# Patient Record
Sex: MALE | Race: AMERICAN INDIAN OR ALASKA NATIVE
[De-identification: names, ages, dates, MRNs, and addresses within clinical notes are randomized per-mention and may not be internally consistent; named-entity substitution may affect disease eponyms.]

---

## 2019-12-16 ENCOUNTER — HOSPITAL ENCOUNTER (OUTPATIENT)
Dept: HOSPITAL 50 - VM.ED | Age: 39
Setting detail: OBSERVATION
LOS: 1 days | Discharge: HOME | End: 2019-12-17
Attending: ADVANCED PRACTICE MIDWIFE | Admitting: ADVANCED PRACTICE MIDWIFE
Payer: MEDICAID

## 2019-12-16 DIAGNOSIS — R07.89: Primary | ICD-10-CM

## 2019-12-16 DIAGNOSIS — E11.9: ICD-10-CM

## 2019-12-16 DIAGNOSIS — Z79.899: ICD-10-CM

## 2019-12-16 DIAGNOSIS — Z79.4: ICD-10-CM

## 2019-12-16 DIAGNOSIS — I10: ICD-10-CM

## 2019-12-16 LAB
ANION GAP SERPL CALC-SCNC: 15.7 MMOL/L (ref 10–20)
CHLORIDE SERPL-SCNC: 101 MMOL/L (ref 98–107)
SODIUM SERPL-SCNC: 140 MMOL/L (ref 136–145)

## 2019-12-16 PROCEDURE — G0008 ADMIN INFLUENZA VIRUS VAC: HCPCS

## 2019-12-16 PROCEDURE — G0378 HOSPITAL OBSERVATION PER HR: HCPCS

## 2019-12-16 RX ADMIN — Medication SCH UNITS: at 18:23

## 2019-12-16 NOTE — EDM.PDOC
ED HPI GENERAL MEDICAL PROBLEM





- General


Chief Complaint: Chest Pain


Time Seen by Provider: 19 13:30


Source of Information: Reports: Patient





- History of Present Illness


INITIAL COMMENTS - FREE TEXT/NARRATIVE: 





Elizabeth is a 40 y/o male who presents to the ER today with complaints of left 

sided anterior chest pain that hit him about 20 minutes ago while he was 

working at his job as a cook. The pain is sharp and goes deep into his chest 

and radiates down his left arm. He does report getting diaphoretic with the 

pain. He has been having similar episodes for the last 2 months and reports 

that the pain does away when he sits down to rest and relax. Today it seemed 

worse than usual. 


He is an insulin dependent Type 2 diabetic and he sees Dr Arnulfo Serna at 

the [facility] here in [place] for Primary Care. He does report that 

his father had an MI and passed away as a result. Several other male relatives 

have also had cardiac issues. He has had some cardiac issues, but it is unclear 

if he had an MI or if the episode was related to a spider bite, but it sounds 

very much like he did have a cardiac cath. Will attempt to locate records.


  ** Left Chest


Pain Score (Numeric/FACES): 7





- Related Data


 Allergies











Allergy/AdvReac Type Severity Reaction Status Date / Time


 


No Known Drug Allergies Allergy  Other Verified 02/27/15 10:10











Home Meds: 


 Home Meds





Insulin Glarg,Human.Rec.Analog [LantUS] 30 unit SUBCUT DAILY 14 [History]


Levothyroxine 150 mcg PO DAILY 14 [History]


Liraglutide [Victoza 3-Vini] 18 mg .XX DAILY 02/27/15 [History]











Review of Systems





- Review of Systems


Review Of Systems: See Below


Constitutional: Reports: Diaphoresis


Eyes: Reports: No Symptoms


Ears: Reports: No Symptoms


Nose: Reports: No Symptoms


Mouth/Throat: Reports: No Symptoms


Respiratory: Reports: Shortness of Breath


Cardiovascular: Reports: Chest Pain


GI/Abdominal: Reports: No Symptoms


Genitourinary: Reports: No Symptoms


Musculoskeletal: Reports: No Symptoms


Skin: Reports: Diaphoresis


Neurological: Reports: No Symptoms


Psychiatric: Reports: No Symptoms





ED EXAM, GENERAL





- Physical Exam


Exam: See Below


Exam Limited By: No Limitations


General Appearance: Alert, WD/WN, No Apparent Distress, Obese (Adult Male, Good 

Historian)


Ears: Hearing Grossly Normal


Nose: Normal Inspection, No Blood


Throat/Mouth: Normal Lips, Normal Teeth, Normal Voice, No Airway Compromise


Head: Atraumatic, Normocephalic


Neck: Normal Inspection, Supple


Respiratory/Chest: No Respiratory Distress, Lungs Clear, Normal Breath Sounds, 

Chest Non-Tender


Cardiovascular: Normal Peripheral Pulses, Regular Rate, Rhythm, No Edema, No 

Murmur, Other (Heart sounds are quite distant due to large chested stature, 

mild tenderness noted with palpation in the left upper chest region)


GI/Abdominal: Normal Bowel Sounds, Soft, Non-Tender, No Distention


 (Male) Exam: Deferred


Rectal (Males) Exam: Deferred


Back Exam: Normal Inspection, Full Range of Motion


Extremities: Normal Inspection, Normal Range of Motion, Non-Tender, No Pedal 

Edema, Normal Capillary Refill


Neurological: Alert, Oriented, CN II-XII Intact, Normal Cognition, Normal Gait, 

No Motor/Sensory Deficits


Psychiatric: Normal Affect, Normal Mood


Skin Exam: Warm, Dry, Intact, Normal Color, No Rash


Lymphatic: No Adenopathy





EKG INTERPRETATION


EKG Date: 19


Time: 13:15


Rhythm: Other (SR with RBBB)


Rate (Beats/Min): 78


Axis: Normal


P-Wave: Present


QRS: RBBB


ST-T: Normal


QT: Normal


Comparison: No Change (Comparison dated 3/9/2013 from [place])





Course





- Vital Signs


Text/Narrative:: 





1330 The patient was seen by the CNP. Labs, EKG, and CXR were ordered. He was 

given ASA and Nitro 0.4mg SL x 1. 





1405 Patient reported to [facility] that he was pain free and he was sitting calmly at 

side of bed. Labs pending. No ischemic changes noted in Tele tracing.





1500 Lab reviewed. Discussed with patient. Will admit to Observation for 

Telemetry and serial labs. Will plan to recheck Troponin at 6 and 12 hours. 

Will monitor patient on telemetry tonight. Patient has been under a great deal 

of stress as a single dad with his 4 children, however he does have some 

pertinent hx and he is a Type 2 Diabetic and Cardiac Disease cannot be 

excluded. He does have a chronic care appointment scheduled with Dr Santosh Serna tomorrow afternoon, but we will see how he does on Observation. 

Patient in Agreement with the plan. 





Please use the ER Note as the Observation H&P.





Last Recorded V/S: 


 Last Vital Signs











Temp  37.3 C   19 13:17


 


Pulse  79   19 13:17


 


Resp  18   19 13:17


 


BP  157/94 H  19 13:41


 


Pulse Ox  97   19 13:17














- Orders/Labs/Meds


Orders: 


 Active Orders 24 hr











 Category Date Time Status


 


 Admission Status [Patient Status] [ADT] Routine ADT  19 15:10 Ordered


 


 Cardiac Monitoring [RC] .AS DIRECTED Care  19 13:25 Active


 


 EKG Documentation Completion [RC] STAT Care  19 13:25 Active


 


 Orthostatic Vital Signs [RC] ASDIRECTED Care  19 13:25 Active


 


 Oxygen Therapy [RC] PRN Care  19 13:25 Active


 


 Pulse Oximetry [RC] CONTINUOUS Care  19 13:25 Active


 


 Nitroglycerin [Nitrostat] Med  19 13:25 Active





 0.4 mg SL Q5M PRN   


 


 Sodium Chloride 0.9% [Saline Flush] Med  19 13:25 Active





 10 ml FLUSH ASDIRECTED PRN   


 


 Assertion [AST] Urgent Oth  19 13:25 Ordered


 


 Peripheral IV Insertion Adult [OM.PC] Stat Oth  19 13:25 Ordered


 


 Saline Lock Insert [OM.PC] Stat Oth  19 13:25 Ordered


 


 Resuscitation Status Routine Resus Stat  19 14:35 Ordered








 Medication Orders





Nitroglycerin (Nitrostat)  0.4 mg SL Q5M PRN


   PRN Reason: Chest Pain


   Stop: 19 14:38


   Last Admin: 19 13:41  Dose: 0.4 mg


Sodium Chloride (Saline Flush)  10 ml FLUSH ASDIRECTED PRN


   PRN Reason: Keep Vein Open








Labs: 


 Laboratory Tests











  19 Range/Units





  12:33 12:33 12:33 


 


WBC  8.2    (4.0-10.0)  x10^3/uL


 


RBC  5.21    (4.5-6.0)  x10^6/uL


 


Hgb  15.1    (14.0-18.0)  g/dL


 


Hct  43.9    (40.0-52.0)  %


 


MCV  84.3    (78.0-93.0)  fL


 


MCH  29.0    (26.0-32.0)  pg


 


MCHC  34.4    (32.0-36.0)  g/dL


 


RDW Coeff of Nacho  12.7    (10.0-15.0)  %


 


Plt Count  154    (130-400)  x10^3/uL


 


Neut % (Auto)  58.5    (50.0-80.0)  %


 


Lymph % (Auto)  34.6    (25.0-50.0)  %


 


Mono % (Auto)  5.5    (2.0-11.0)  %


 


Eos % (Auto)  1.0    (0.0-4.0)  %


 


Baso % (Auto)  0.4    (0.2-1.2)  %


 


PT   9.9 L   (10.0-12.8)  SEC


 


INR   0.9 L   (2.0-3.5)  


 


APTT   21.9 L   (24.0-36.0)  SEC


 


Sodium    140  (136-145)  mmol/L


 


Potassium    3.7  (3.5-5.1)  mmol/L


 


Chloride    101  ()  mmol/L


 


Carbon Dioxide    27  (21-32)  mmol/L


 


Anion Gap    15.7  (10-20)  mmol/L


 


BUN    12  (7-18)  mg/dL


 


Creatinine    0.7  (0.70-1.30)  mg/dL


 


Est Cr Clr Drug Dosing    TNP  


 


Estimated GFR (MDRD)    > 60  


 


Glucose    150 H  ()  mg/dL


 


Calcium    9.4  (8.5-10.1)  mg/dL


 


Corrected Calcium    9.72  (8.5-10.1)  mg/dL


 


Magnesium    1.9  (1.8-2.4)  mg/dL


 


Total Bilirubin    0.9  (0.2-1.0)  mg/dL


 


AST    31  (15-37)  U/L


 


ALT    63  (16-63)  U/L


 


Alkaline Phosphatase    87  ()  U/L


 


Troponin I    0.00  (0.00-0.08)  ng/mL


 


Total Protein    7.1  (6.4-8.2)  g/dL


 


Albumin    3.6  (3.4-5.0)  g/dL


 


Globulin    3.5  


 


Albumin/Globulin Ratio    1.03  











Meds: 


Medications











Generic Name Dose Route Start Last Admin





  Trade Name Freq  PRN Reason Stop Dose Admin


 


Nitroglycerin  0.4 mg  19 13:25  19 13:41





  Nitrostat  SL  19 14:38  0.4 mg





  Q5M PRN   Administration





  Chest Pain   





     





     





     


 


Sodium Chloride  10 ml  19 13:25  





  Saline Flush  FLUSH   





  ASDIRECTED PRN   





  Keep Vein Open   





     





     





     














Discontinued Medications














Generic Name Dose Route Start Last Admin





  Trade Name Freq  PRN Reason Stop Dose Admin


 


Aspirin  324 mg  19 13:25  19 13:40





  Aspirin  PO  19 13:26  324 mg





  ONETIME ONE   Administration





     





     





     





     














Departure





- Departure


Time of Disposition: 03:15


Disposition:  20


Condition: Good


Clinical Impression: 


 Chest pain, Type 2 diabetes mellitus








- Discharge Information


*PRESCRIPTION DRUG MONITORING PROGRAM REVIEWED*: Not Applicable


*COPY OF PRESCRIPTION DRUG MONITORING REPORT IN PATIENT JIMMY: Not Applicable


Referrals: 


Asa Sutherland MD [Primary Care Provider] - 


Forms:  ED Department Discharge





Sepsis Event Note





- Focused Exam


Vital Signs: 


 Vital Signs











  Temp Pulse Resp BP BP Pulse Ox


 


 19 13:41     157/94 H  


 


 19 13:17  37.3 C  79  18   157/94 H  97











Date Exam was Performed: 19


Time Exam was Performed: 15:22





- Problem List & Annotations


(1) Chest pain


SNOMED Code(s): 34572687


   Code(s): R07.9 - CHEST PAIN, UNSPECIFIED   Status: Acute   Current Visit: No

   Onset Date: ~19   Annotation/Comment:: -Will admit to Observation and 

place on Telemetry and plan serial enzymes at 6 and 12 hours (, 0200)


-Consult Cardiology as needed


   





(2) Type 2 diabetes mellitus


SNOMED Code(s): 33933343


   Code(s): E11.9 - TYPE 2 DIABETES MELLITUS WITHOUT COMPLICATIONS   Status: 

Acute   Current Visit: Yes   Annotation/Comment:: -Continue home insluin doses 

and BGM   


Qualifiers: 


   Diabetes mellitus long term insulin use: with long term use 





- My Orders


Last 24 Hours: 


My Active Orders





19 13:25


Cardiac Monitoring [RC] .AS DIRECTED 


EKG Documentation Completion [RC] STAT 


Orthostatic Vital Signs [RC] ASDIRECTED 


Oxygen Therapy [RC] PRN 


Pulse Oximetry [RC] CONTINUOUS 


Nitroglycerin [Nitrostat]   0.4 mg SL Q5M PRN 


Sodium Chloride 0.9% [Saline Flush]   10 ml FLUSH ASDIRECTED PRN 


Assertion [AST] Urgent 


Peripheral IV Insertion Adult [OM.PC] Stat 


Saline Lock Insert [OM.PC] Stat 





19 14:35


Resuscitation Status Routine 





19 15:10


Admission Status [Patient Status] [ADT] Routine 














- Assessment/Plan


Last 24 Hours: 


My Active Orders





19 13:25


Cardiac Monitoring [RC] .AS DIRECTED 


EKG Documentation Completion [RC] STAT 


Orthostatic Vital Signs [RC] ASDIRECTED 


Oxygen Therapy [RC] PRN 


Pulse Oximetry [RC] CONTINUOUS 


Nitroglycerin [Nitrostat]   0.4 mg SL Q5M PRN 


Sodium Chloride 0.9% [Saline Flush]   10 ml FLUSH ASDIRECTED PRN 


Assertion [AST] Urgent 


Peripheral IV Insertion Adult [OM.PC] Stat 


Saline Lock Insert [OM.PC] Stat 





19 14:35


Resuscitation Status Routine 





19 15:10


Admission Status [Patient Status] [ADT] Routine

## 2019-12-17 RX ADMIN — Medication SCH UNITS: at 08:11

## 2019-12-17 NOTE — PCM.DCSUM1
**Discharge Summary





- Hospital Course


HPI Initial Comments: 





Elizabeth is a 40 y/o male who presented to the ER with complaints of chest pain 

and SOB while working. His troponin and EKG in ER were WNL. His pain had 

improved after arrival to the ER.


Brief History: The patient was observed on telemetry and serial troponin levels 

at 6 and 12 hours were done and both negative. He did continue to have elevated 

blood pressures in the AM. He was given a dose of Toprol XL 25mg po for the 

blood pressure.


Diagnosis: Stroke: No


Modified [place] Scale: No Symptoms at All


Modified [place] Scale Score: 0





- Discharge Data


Discharge Date: 12/17/19


Discharge Disposition: Home, Self-Care 01


Condition: Good





- Referral to Home Health


Primary Care Physician: 


Asa Sutherland MD








- Discharge Diagnosis/Problem(s)


(1) Chest pain


SNOMED Code(s): 75698804


   ICD Code: R07.9 - CHEST PAIN, UNSPECIFIED   Status: Acute   Onset Date: ~12/ 16/19   





(2) Type 2 diabetes mellitus


SNOMED Code(s): 15365086


   ICD Code: E11.9 - TYPE 2 DIABETES MELLITUS WITHOUT COMPLICATIONS   Status: 

Acute   Problem Details: -Continue home insluin doses and BGM   


Qualifiers: 


   Diabetes mellitus long term insulin use: with long term use 





(3) Hypertension


SNOMED Code(s): 46306487


   ICD Code: I10 - ESSENTIAL (PRIMARY) HYPERTENSION   Status: Acute   Problem 

Details: -Will given Toprol XL 25mg po this AM and then have patient's PCP 

prescribe long term meds and follow.   


Qualifiers: 


   Hypertension type: essential hypertension   Qualified Code(s): I10 - 

Essential (primary) hypertension   





- Patient Instructions


Diet: Diabetic Diet


Activity: As Tolerated


Driving: May Drive Today





- Discharge Plan


*PRESCRIPTION DRUG MONITORING PROGRAM REVIEWED*: Not Applicable


*COPY OF PRESCRIPTION DRUG MONITORING REPORT IN PATIENT JIMMY: Not Applicable


Home Medications: 


 Home Meds





Patient's Own Medication [Ptom] 1.2 mg SUBCUT DAILY 12/16/19 [History]


Patient's Own Medication [Ptom] 12.5 mg PO DAILY 12/16/19 [History]


Patient's Own Medication [Ptom] 50 units SUBCUT DAILY 12/16/19 [History]


Patient's Own Medication [Ptom] 200 mcg PO ACBREAKFAST 12/16/19 [History]








Oxygen Therapy Mode: Room Air


Patient Handouts:  Interscalene Nerve Block, Nonspecific Chest Pain, Cardiac-

Specific Troponin I and T Test


Forms:  Return to Work/Inpatient [facility], ED Department Discharge


Referrals: 


Asa Sutherland MD [Primary Care Provider] - 





- Discharge Summary/Plan Comment


DC Time >30 min.: Yes





- General Info


Admission Dx/Problem (Free Text: 





1)Chest Pain


2)Hx Type 2 DM


3)HTN





- Patient Data


Vitals - Most Recent: 


 Last Vital Signs











Temp  36.4 C   12/17/19 05:26


 


Pulse  72   12/17/19 09:33


 


Resp  17   12/17/19 05:26


 


BP  176/100 H  12/17/19 09:33


 


Pulse Ox  98   12/17/19 06:00








 





Orthostatic Blood Pressure [     130/92


Standing]                        


Orthostatic Blood Pressure [     146/99


Sitting]                         


Orthostatic Blood Pressure [     164/103


Supine]                          








Weight - Most Recent: 163.293 kg


I&O - Last 24 hours: 


 Intake & Output











 12/17/19 12/17/19 12/17/19





 06:59 14:59 22:59


 


Intake Total 300 540 


 


Balance 300 540 











Lab Results - Last 24 hrs: 


 Laboratory Results - last 24 hr











  12/16/19 12/17/19 12/17/19 Range/Units





  22:05 03:00 06:08 


 


POC Glucose    107 H  ()  mg/dL


 


Troponin I  < 0.017  < 0.017   (<=0.056)  ng/mL











Med Orders - Current: 


 Current Medications








Discontinued Medications





Acetaminophen (Tylenol)  650 mg PO Q4H PRN


   PRN Reason: Pain (Mild 1-3)/fever


   Last Admin: 12/16/19 19:48 Dose:  650 mg


Aspirin (Aspirin)  324 mg PO ONETIME ONE


   Stop: 12/16/19 13:26


   Last Admin: 12/16/19 13:40 Dose:  324 mg


Ibuprofen (Motrin)  600 mg PO Q6H PRN


   PRN Reason: Pain (mild 1-3)


Influenza Virus Vaccine (Pharmacy To Dose - Influenza Vaccine)  1 each IM 

ONETIME ONE


   Stop: 12/16/19 16:31


Influenza Virus Vaccine (Fluzone Quad [id number] Syringe)  60 mcg IM .ONCE ONE


   Stop: 12/16/19 17:01


   Last Admin: 12/16/19 17:03 Dose:  60 mcg


Metoprolol Succinate (Toprol Xl)  25 mg PO ONETIME ONE


   Stop: 12/17/19 09:19


   Last Admin: 12/17/19 09:33 Dose:  25 mg


Nitroglycerin (Nitrostat)  0.4 mg SL Q5M PRN


   PRN Reason: Chest Pain


   Stop: 12/17/19 14:38


   Last Admin: 12/16/19 13:41 Dose:  0.4 mg


Victoza Pen**Own Med (**)  1.2 mg SUBCUT DAILY [facility]


   Last Admin: 12/17/19 08:11 Dose:  1.2 mg


Hydrochlorothiazide (12.5 Mg**Own Med**)  12.5 mg PO DAILY [facility]


   Last Admin: 12/17/19 08:11 Dose:  12.5 mg


Lantus Pen**Own Med* (*)  50 units SUBCUT DAILY [facility]


   Last Admin: 12/17/19 08:11 Dose:  50 units


Non-Formulary Medication (Patient's Own Medication [Ptom])  175 mcg PO 

ACBREAKFAST [facility]


Levothyroxine 200 (Mcg**Own Med**)  200 mcg PO ACBREAKFAST [facility]


   Last Admin: 12/17/19 06:03 Dose:  200 mcg


Sodium Chloride (Saline Flush)  10 ml FLUSH ASDIRECTED PRN


   PRN Reason: Keep Vein Open


Temazepam (Restoril)  15 mg PO BEDTIME PRN


   PRN Reason: Sleep


   Last Admin: 12/16/19 21:30 Dose:  15 mg

## 2019-12-17 NOTE — PCM.PN
- General Info


Date of Service: 12/17/19


Admission Dx/Problem (Free Text): 





1)Chest Pain


Subjective Update: 





Elizabeth is  seen on rounds this AM. Reports he slept very well last night after 

having the prn Restoril. He reports that it helped him turn off all the 

thoughts in his mind and take his stress aways so he could relax to sleep. He 

has had no further chest pain.





Blood pressures have been running elevated since being admitted to Observation. 

150-170/90-100s.





- Review of Systems


General: Reports: No Symptoms


HEENT: Reports: No Symptoms


Pulmonary: Reports: No Symptoms


Cardiovascular: Reports: No Symptoms


Gastrointestinal: Reports: No Symptoms


Genitourinary: Reports: No Symptoms


Musculoskeletal: Reports: No Symptoms


Skin: Reports: No Symptoms


Neurological: Reports: No Symptoms


Psychiatric: Reports: No Symptoms





- Patient Data


Vitals - Most Recent: 


 Last Vital Signs











Temp  36.4 C   12/17/19 05:26


 


Pulse  72   12/17/19 05:26


 


Resp  17   12/17/19 05:26


 


BP  176/100 H  12/17/19 05:26


 


Pulse Ox  98   12/17/19 06:00








 





Orthostatic Blood Pressure [     130/92


Standing]                        


Orthostatic Blood Pressure [     146/99


Sitting]                         


Orthostatic Blood Pressure [     164/103


Supine]                          








Weight - Most Recent: 163.293 kg


I&O - Last 24 Hours: 


 Intake & Output











 12/16/19 12/17/19 12/17/19





 22:59 06:59 14:59


 


Intake Total  300 540


 


Output Total 200  


 


Balance -200 300 540











Lab Results Last 24 Hours: 


 Laboratory Results - last 24 hr











  12/16/19 12/16/19 12/16/19 Range/Units





  12:33 12:33 12:33 


 


WBC  8.2    (4.0-10.0)  x10^3/uL


 


RBC  5.21    (4.5-6.0)  x10^6/uL


 


Hgb  15.1    (14.0-18.0)  g/dL


 


Hct  43.9    (40.0-52.0)  %


 


MCV  84.3    (78.0-93.0)  fL


 


MCH  29.0    (26.0-32.0)  pg


 


MCHC  34.4    (32.0-36.0)  g/dL


 


RDW Coeff of Nacho  12.7    (10.0-15.0)  %


 


Plt Count  154    (130-400)  x10^3/uL


 


Neut % (Auto)  58.5    (50.0-80.0)  %


 


Lymph % (Auto)  34.6    (25.0-50.0)  %


 


Mono % (Auto)  5.5    (2.0-11.0)  %


 


Eos % (Auto)  1.0    (0.0-4.0)  %


 


Baso % (Auto)  0.4    (0.2-1.2)  %


 


PT   9.9 L   (10.0-12.8)  SEC


 


INR   0.9 L   (2.0-3.5)  


 


APTT   21.9 L   (24.0-36.0)  SEC


 


Sodium    140  (136-145)  mmol/L


 


Potassium    3.7  (3.5-5.1)  mmol/L


 


Chloride    101  ()  mmol/L


 


Carbon Dioxide    27  (21-32)  mmol/L


 


Anion Gap    15.7  (10-20)  mmol/L


 


BUN    12  (7-18)  mg/dL


 


Creatinine    0.7  (0.70-1.30)  mg/dL


 


Est Cr Clr Drug Dosing    TNP  


 


Estimated GFR (MDRD)    > 60  


 


Glucose    150 H  ()  mg/dL


 


POC Glucose     ()  mg/dL


 


Calcium    9.4  (8.5-10.1)  mg/dL


 


Corrected Calcium    9.72  (8.5-10.1)  mg/dL


 


Magnesium    1.9  (1.8-2.4)  mg/dL


 


Total Bilirubin    0.9  (0.2-1.0)  mg/dL


 


AST    31  (15-37)  U/L


 


ALT    63  (16-63)  U/L


 


Alkaline Phosphatase    87  ()  U/L


 


Troponin I    0.00  (0.00-0.08)  ng/mL


 


Total Protein    7.1  (6.4-8.2)  g/dL


 


Albumin    3.6  (3.4-5.0)  g/dL


 


Globulin    3.5  


 


Albumin/Globulin Ratio    1.03  














  12/16/19 12/16/19 12/16/19 Range/Units





  16:44 19:45 22:05 


 


WBC     (4.0-10.0)  x10^3/uL


 


RBC     (4.5-6.0)  x10^6/uL


 


Hgb     (14.0-18.0)  g/dL


 


Hct     (40.0-52.0)  %


 


MCV     (78.0-93.0)  fL


 


MCH     (26.0-32.0)  pg


 


MCHC     (32.0-36.0)  g/dL


 


RDW Coeff of Nacho     (10.0-15.0)  %


 


Plt Count     (130-400)  x10^3/uL


 


Neut % (Auto)     (50.0-80.0)  %


 


Lymph % (Auto)     (25.0-50.0)  %


 


Mono % (Auto)     (2.0-11.0)  %


 


Eos % (Auto)     (0.0-4.0)  %


 


Baso % (Auto)     (0.2-1.2)  %


 


PT     (10.0-12.8)  SEC


 


INR     (2.0-3.5)  


 


APTT     (24.0-36.0)  SEC


 


Sodium     (136-145)  mmol/L


 


Potassium     (3.5-5.1)  mmol/L


 


Chloride     ()  mmol/L


 


Carbon Dioxide     (21-32)  mmol/L


 


Anion Gap     (10-20)  mmol/L


 


BUN     (7-18)  mg/dL


 


Creatinine     (0.70-1.30)  mg/dL


 


Est Cr Clr Drug Dosing     


 


Estimated GFR (MDRD)     


 


Glucose     ()  mg/dL


 


POC Glucose  111 H  255 H   ()  mg/dL


 


Calcium     (8.5-10.1)  mg/dL


 


Corrected Calcium     (8.5-10.1)  mg/dL


 


Magnesium     (1.8-2.4)  mg/dL


 


Total Bilirubin     (0.2-1.0)  mg/dL


 


AST     (15-37)  U/L


 


ALT     (16-63)  U/L


 


Alkaline Phosphatase     ()  U/L


 


Troponin I    < 0.017  (0.00-0.08)  ng/mL


 


Total Protein     (6.4-8.2)  g/dL


 


Albumin     (3.4-5.0)  g/dL


 


Globulin     


 


Albumin/Globulin Ratio     














  12/17/19 12/17/19 Range/Units





  03:00 06:08 


 


WBC    (4.0-10.0)  x10^3/uL


 


RBC    (4.5-6.0)  x10^6/uL


 


Hgb    (14.0-18.0)  g/dL


 


Hct    (40.0-52.0)  %


 


MCV    (78.0-93.0)  fL


 


MCH    (26.0-32.0)  pg


 


MCHC    (32.0-36.0)  g/dL


 


RDW Coeff of Nacho    (10.0-15.0)  %


 


Plt Count    (130-400)  x10^3/uL


 


Neut % (Auto)    (50.0-80.0)  %


 


Lymph % (Auto)    (25.0-50.0)  %


 


Mono % (Auto)    (2.0-11.0)  %


 


Eos % (Auto)    (0.0-4.0)  %


 


Baso % (Auto)    (0.2-1.2)  %


 


PT    (10.0-12.8)  SEC


 


INR    (2.0-3.5)  


 


APTT    (24.0-36.0)  SEC


 


Sodium    (136-145)  mmol/L


 


Potassium    (3.5-5.1)  mmol/L


 


Chloride    ()  mmol/L


 


Carbon Dioxide    (21-32)  mmol/L


 


Anion Gap    (10-20)  mmol/L


 


BUN    (7-18)  mg/dL


 


Creatinine    (0.70-1.30)  mg/dL


 


Est Cr Clr Drug Dosing    


 


Estimated GFR (MDRD)    


 


Glucose    ()  mg/dL


 


POC Glucose   107 H  ()  mg/dL


 


Calcium    (8.5-10.1)  mg/dL


 


Corrected Calcium    (8.5-10.1)  mg/dL


 


Magnesium    (1.8-2.4)  mg/dL


 


Total Bilirubin    (0.2-1.0)  mg/dL


 


AST    (15-37)  U/L


 


ALT    (16-63)  U/L


 


Alkaline Phosphatase    ()  U/L


 


Troponin I  < 0.017   (0.00-0.08)  ng/mL


 


Total Protein    (6.4-8.2)  g/dL


 


Albumin    (3.4-5.0)  g/dL


 


Globulin    


 


Albumin/Globulin Ratio    











Med Orders - Current: 


 Current Medications





Acetaminophen (Tylenol)  650 mg PO Q4H PRN


   PRN Reason: Pain (Mild 1-3)/fever


   Last Admin: 12/16/19 19:48 Dose:  650 mg


Ibuprofen (Motrin)  600 mg PO Q6H PRN


   PRN Reason: Pain (mild 1-3)


Metoprolol Succinate (Toprol Xl)  25 mg PO ONETIME ONE


   Stop: 12/17/19 09:19


Nitroglycerin (Nitrostat)  0.4 mg SL Q5M PRN


   PRN Reason: Chest Pain


   Stop: 12/17/19 14:38


   Last Admin: 12/16/19 13:41 Dose:  0.4 mg


Victoza Pen**Own Med (**)  1.2 mg SUBCUT DAILY [facility]


   Last Admin: 12/17/19 08:11 Dose:  1.2 mg


Hydrochlorothiazide (12.5 Mg**Own Med**)  12.5 mg PO DAILY [facility]


   Last Admin: 12/17/19 08:11 Dose:  12.5 mg


Lantus Pen**Own Med* (*)  50 units SUBCUT DAILY [facility]


   Last Admin: 12/17/19 08:11 Dose:  50 units


Levothyroxine 200 (Mcg**Own Med**)  200 mcg PO ACBREAKFAST [facility]


   Last Admin: 12/17/19 06:03 Dose:  200 mcg


Sodium Chloride (Saline Flush)  10 ml FLUSH ASDIRECTED PRN


   PRN Reason: Keep Vein Open


Temazepam (Restoril)  15 mg PO BEDTIME PRN


   PRN Reason: Sleep


   Last Admin: 12/16/19 21:30 Dose:  15 mg





Discontinued Medications





Aspirin (Aspirin)  324 mg PO ONETIME ONE


   Stop: 12/16/19 13:26


   Last Admin: 12/16/19 13:40 Dose:  324 mg


Influenza Virus Vaccine (Pharmacy To Dose - Influenza Vaccine)  1 each IM 

ONETIME ONE


   Stop: 12/16/19 16:31


Influenza Virus Vaccine (Fluzone Quad [id number] Syringe)  60 mcg IM .ONCE ONE


   Stop: 12/16/19 17:01


   Last Admin: 12/16/19 17:03 Dose:  60 mcg


Non-Formulary Medication (Patient's Own Medication [Ptom])  175 mcg PO 

ACBREAKFAST [facility]











- Exam


General: Alert, Oriented, Cooperative, No Acute Distress, Other (adult male, 

look well rested)


HEENT: Pupils Equal, Pupils Reactive


Neck: Supple


Lungs: Clear to Auscultation, Normal Respiratory Effort


Cardiovascular: Regular Rate, Regular Rhythm, Other (Note Sinus Rhythym on 

telemetry)


GI/Abdominal Exam: Normal Bowel Sounds, Soft, Non-Tender


 (Male) Exam: Deferred


Back Exam: Normal Inspection


Extremities: Normal Inspection, Normal Range of Motion, Non-Tender, No Pedal 

Edema, Normal Capillary Refill


Skin: Warm, Dry, Intact


Wound/Incisions: Other (NA)


Neurological: No New Focal Deficit, Normal Speech, Cranial Nerves Intact


Psy/Mental Status: Alert, Normal Affect





Sepsis Event Note





- Evaluation


Sepsis Screening Result: No Definite Risk





- Focused Exam


Vital Signs: 


 Vital Signs











  Temp Temp Pulse Resp BP Pulse Ox


 


 12/17/19 06:00       98


 


 12/17/19 05:26  36.4 C   72  17  176/100 H  97


 


 12/17/19 02:00       93 L


 


 12/17/19 01:56   36.3 C  61  18  165/93 H  95


 


 12/16/19 22:00       96


 


 12/16/19 21:35  36.7 C   75  18  166/94 H  99











Date Exam was Performed: 12/17/19


Time Exam was Performed: 09:19





- Problem List & Annotations


(1) Chest pain


SNOMED Code(s): 33006623


   Code(s): R07.9 - CHEST PAIN, UNSPECIFIED   Status: Acute   Current Visit: No

   Onset Date: ~12/16/19   





(2) Type 2 diabetes mellitus


SNOMED Code(s): 73282444


   Code(s): E11.9 - TYPE 2 DIABETES MELLITUS WITHOUT COMPLICATIONS   Status: 

Acute   Current Visit: Yes   


Qualifiers: 


   Diabetes mellitus long term insulin use: with long term use 


Annotation/Comment:: -Continue home insluin doses and BGM   





(3) Hypertension


SNOMED Code(s): 35780997


   Code(s): I10 - ESSENTIAL (PRIMARY) HYPERTENSION   Status: Acute   Current 

Visit: Yes   


Qualifiers: 


   Hypertension type: unspecified   Qualified Code(s): I10 - Essential (primary

) hypertension   


Annotation/Comment:: -Will given Toprol XL 25mg po this AM and then have patient

's PCP prescribe long term meds and follow.   





- Problem List Review


Problem List Initiated/Reviewed/Updated: Yes





- My Orders


Last 24 Hours: 


My Active Orders





12/16/19 13:25


Cardiac Monitoring [RC] 06,10,14,18,22,02 


Orthostatic Vital Signs [RC] ASDIRECTED 


Oxygen Therapy [RC] .PRN 


Pulse Oximetry [RC] 06,10,14,18,22,02 


Nitroglycerin [Nitrostat]   0.4 mg SL Q5M PRN 


Sodium Chloride 0.9% [Saline Flush]   10 ml FLUSH ASDIRECTED PRN 


Assertion [AST] Urgent 


Peripheral IV Insertion Adult [OM.PC] Stat 


Saline Lock Insert [OM.PC] Stat 





12/16/19 14:35


Resuscitation Status Routine 





12/16/19 15:10


Admission Status [Patient Status] [ADT] Routine 





12/16/19 15:36


Blood Glucose Check, Bedside [RC] 07,11,17,20 


Height and Weight [RC] .PRN 


Oxygen Therapy [RC] .PRN 


Up ad Sandra [RC] 08,20 


Vital Signs [RC] 06,10,14,18,22,02 


Acetaminophen [Tylenol]   650 mg PO Q4H PRN 


Ibuprofen [Motrin]   600 mg PO Q6H PRN 


Temazepam [Restoril]   15 mg PO BEDTIME PRN 





12/16/19 16:30


Influenza Vaccine Charge [RC] .DISCHARGE 





12/16/19 18:30


Patient's Own Medication [Ptom]   50 units SUBCUT DAILY 





12/16/19 Dinner


[employer] Diet [DIET] 





12/17/19 07:00


Patient's Own Medication [Ptom]   200 mcg PO ACBREAKFAST 





12/17/19 08:00


Patient's Own Medication [Ptom]   1.2 mg SUBCUT DAILY 


Patient's Own Medication [Ptom]   12.5 mg PO DAILY 





12/17/19 09:18


Metoprolol Succinate [Toprol XL]   25 mg PO ONETIME ONE 














- Assessment


Assessment:: 





1)Chest Pain, Resolved


2)Anxiety, Suspect as cause of Chest Pain


3)Type 2 DM, Insulin Dependent-Stable


4)Hypertension-Uncontrolled





- Plan


Plan:: 





-Discharge to home today


-Patient to keep previously scheduled appt with Dr Santosh Serna today at 4 pm.


-Will have Dr Serna address anxiety and uncontrolled HTN

## 2022-01-03 ENCOUNTER — HOSPITAL ENCOUNTER (EMERGENCY)
Dept: HOSPITAL 50 - VM.ED | Age: 42
Discharge: HOME | End: 2022-01-03
Payer: MEDICAID

## 2022-01-03 DIAGNOSIS — E11.9: ICD-10-CM

## 2022-01-03 DIAGNOSIS — M54.50: Primary | ICD-10-CM

## 2022-01-03 DIAGNOSIS — Z79.4: ICD-10-CM

## 2022-01-03 NOTE — EDM.PDOC
ED HPI GENERAL MEDICAL PROBLEM





- General


Chief Complaint: Back Pain or Injury


Stated Complaint: SEVERE BACK PAIN


Time Seen by Provider: 01/03/22 11:17


Source of Information: Reports: Patient





- History of Present Illness


INITIAL COMMENTS - FREE TEXT/NARRATIVE: 





Elizabeth is a 42 y/o male who comes to the ER with low back pain that seems to be

getting worse. Had had issues over the course of the last month, but no specific

injury. He did use Advil last night at 10pm, but nothing since. Denies any loss 

of bowel or bladder. Some tingling in his hands. Reports it feel sbetter to lay 

flat. 


  ** Lower Back


Pain Score (Numeric/FACES): 8





- Related Data


                                    Allergies











Allergy/AdvReac Type Severity Reaction Status Date / Time


 


No Known Drug Allergies Allergy  Other Verified 01/03/22 11:35











Home Meds: 


                                    Home Meds





Insulin Glarg,Human.Rec.Analog [Lantus Solostar] 50 units DAILY 12/16/19 

[History]


Levothyroxine 175 mcg DAILY 12/16/19 [History]


Liraglutide [Victoza] 1.2 units DAILY 12/16/19 [History]


Patient's Own Medication [Ptom] 1.2 mg SUBCUT DAILY 12/16/19 [History]


Patient's Own Medication [Ptom] 12.5 mg PO DAILY 12/16/19 [History]


Patient's Own Medication [Ptom] 50 units SUBCUT DAILY 12/16/19 [History]


Patient's Own Medication [Ptom] 200 mcg PO ACBREAKFAST 12/16/19 [History]


hydroCHLOROthiazide [Hydrochlorothiazide] 25 mg DAILY 12/16/19 [History]


Cyclobenzaprine [Flexeril] 10 mg PO TID PRN #20 tab 01/03/22 [Rx]


Ibuprofen 800 mg PO TID #60 tablet 01/03/22 [Rx]











Past Medical History


HEENT History: Reports: None


Cardiovascular History: Reports: Arrhythmia


Respiratory History: Reports: Other (See Below)


Other Respiratory History: Bronchitis


Gastrointestinal History: Reports: None


Genitourinary History: Reports: None


Musculoskeletal History: Reports: Back Pain, Chronic, Other (See Below)


Other Musculoskeletal History: ankle fx.  hand fx


Neurological History: Reports: Other (See Below)


Other Neuro History: Anxiety


Psychiatric History: Reports: Anxiety, Depression


Endocrine/Metabolic History: Reports: Diabetes, Type II


Hematologic History: Reports: None


Immunologic History: Reports: None


Oncologic (Cancer) History: Reports: None


Dermatologic History: Reports: None





- Past Surgical History


Musculoskeletal Surgical History: Reports: Other (See Below)


Other Musculoskeletal Surgeries/Procedures:: metal alethea in left arm





Social & Family History





- Family History


Cardiac: Reports: Bypass





- Caffeine Use


Caffeine Use: Reports: Coffee, Energy Drinks





Review of Systems





- Review of Systems


Review Of Systems: See Below


Constitutional: Reports: No Symptoms


Ears: Reports: No Symptoms


Nose: Reports: No Symptoms


Mouth/Throat: Reports: No Symptoms


Respiratory: Reports: No Symptoms


Cardiovascular: Reports: No Symptoms


GI/Abdominal: Reports: No Symptoms


Genitourinary: Reports: No Symptoms


Musculoskeletal: Reports: Back Pain (Low back pain radiating across both sides, 

R>L)


Skin: Reports: No Symptoms


Neurological: Reports: Tingling (Hands)


Psychiatric: Reports: No Symptoms





ED EXAM, GENERAL





- Physical Exam


Exam: See Below


Exam Limited By: No Limitations


General Appearance: Alert, WD/WN, No Apparent Distress (Adult male, neatly 

dressed lying flat on the ER cart in a position of comfort)


Ears: Normal External Exam, Hearing Grossly Normal


Nose: Normal Inspection, Normal Mucosa


Throat/Mouth: Normal Inspection, Normal Lips, Normal Voice


Head: Atraumatic, Normocephalic


Neck: Normal Inspection, Supple, Non-Tender


Respiratory/Chest: No Respiratory Distress, Lungs Clear, Chest Non-Tender


Cardiovascular: Normal Peripheral Pulses, Regular Rate, Rhythm, No Murmur


GI/Abdominal: Normal Bowel Sounds, Soft


 (Male) Exam: Deferred


Rectal (Males) Exam: Deferred


Back Exam: Other (Note tenderness to palpation in the paraspinous muscles of the

 lower lumbar region.  No tenderness over the spine, sacro-iliac joint or 

sciatic notch. )


Extremities: Normal Inspection, No Pedal Edema, Other (Patient is mildly 

uncomfortable, wincing with pain getting up on the table as well as lying down 

and sitting up.Strength, sensation and reflexes are intact in the lower 

extremities. Straight leg raising is negative.)


Neurological: Alert, Oriented, CN II-XII Intact, Normal Cognition, No 

Motor/Sensory Deficits


Psychiatric: Normal Affect, Normal Mood


Skin Exam: Warm, Dry, Intact, Normal Color





Course





- Vital Signs


Text/Narrative:: 





The patient was seen by the CNP. Toradol 30mg IM and Valium 5mg po ordered for 

pain.





1250 Feeling much better and muscle spasms lessened. Will send home with NSAIDS 

and muscle relaxers. Favor musculoskeletal spasms as cause of pain. Considered 

course of steroids, but since patient has T2DM, will see if he improves without 

steroids. Will have his PCP reassess as needed.


Written instructions were given and he left the ER in stable condition. 


Last Recorded V/S: 


                                Last Vital Signs











Temp  36.2 C   01/03/22 11:12


 


Pulse  93   01/03/22 11:12


 


Resp  18   01/03/22 11:12


 


BP  121/72   01/03/22 11:12


 


Pulse Ox  98   01/03/22 11:12














- Orders/Labs/Meds


Meds: 


Medications














Discontinued Medications














Generic Name Dose Route Start Last Admin





  Trade Name Freq  PRN Reason Stop Dose Admin


 


Diazepam  5 mg  01/03/22 11:29  01/03/22 11:48





  Diazepam 5 Mg Tab  PO  01/03/22 11:30  5 mg





  ONETIME ONE   Administration


 


Ketorolac Tromethamine  30 mg  01/03/22 11:29  01/03/22 11:48





  Ketorolac 30 Mg/Ml Sdv  IM  01/03/22 11:30  30 mg





  ONETIME ONE   Administration














Departure





- Departure


Time of Disposition: 13:13


Disposition: Home, Self-Care 01


Clinical Impression: 


 Musculoskeletal back pain








- Discharge Information


Prescriptions: 


Cyclobenzaprine [Flexeril] 10 mg PO TID PRN #20 tab


 PRN Reason: Spasms


Ibuprofen 800 mg PO TID #60 tablet


Instructions:  Acute Back Pain, Adult, Back Injury Prevention, Easy-to-Read


Referrals: 


Bridgette Wang MD [Primary Care Provider] - 


Forms:  ED Department Discharge, ED Return to Work/School Form


Additional Instructions: 





-Ibuprofen 800mg oral 3x daily for pain/inflammation #60(Rx)





-Cyclobenzaprine 10 mg orally every 8 hours as needed for muscle spasms #20 (Rx)





-Ice or heat applied to the area as needed





-Rest, Increase activity as able. Work note given.





-Follow up with your Primary Care Provider to arrange further diagnostic testing

if the pain persists





-Return to the ER if any other concerns











Sepsis Event Note (ED)





- Focused Exam


Vital Signs: 


                                   Vital Signs











  Temp Pulse Resp BP Pulse Ox


 


 01/03/22 11:12  36.2 C  93  18  121/72  98














- Problem List & Annotations


(1) Musculoskeletal back pain


SNOMED Code(s): 633315267, 855287552


   Code(s): M54.9 - DORSALGIA, UNSPECIFIED   Status: Acute   Current Visit: Yes 

 Annotation/Comment:: Pain improved with Toradol and Valium. Home with NSAIDS 

and muscle relaxers. Will have him FU with his PCP for further diagnostic 

imaging if needed.   





- Problem List Review


Problem List Initiated/Reviewed/Updated: Yes